# Patient Record
Sex: MALE | URBAN - METROPOLITAN AREA
[De-identification: names, ages, dates, MRNs, and addresses within clinical notes are randomized per-mention and may not be internally consistent; named-entity substitution may affect disease eponyms.]

---

## 2023-08-16 ENCOUNTER — OFFICE VISIT (OUTPATIENT)
Dept: URBAN - METROPOLITAN AREA CLINIC 33 | Facility: CLINIC | Age: 78
End: 2023-08-16

## 2023-09-13 ENCOUNTER — OFFICE VISIT (OUTPATIENT)
Dept: URBAN - METROPOLITAN AREA CLINIC 33 | Facility: CLINIC | Age: 78
End: 2023-09-13
Payer: MEDICARE

## 2023-09-13 VITALS
HEIGHT: 66 IN | SYSTOLIC BLOOD PRESSURE: 90 MMHG | DIASTOLIC BLOOD PRESSURE: 62 MMHG | BODY MASS INDEX: 26.52 KG/M2 | WEIGHT: 165 LBS

## 2023-09-13 DIAGNOSIS — R12 HEARTBURN: ICD-10-CM

## 2023-09-13 DIAGNOSIS — I95.89 OTHER SPECIFIED HYPOTENSION: ICD-10-CM

## 2023-09-13 DIAGNOSIS — R13.14 PHARYNGOESOPHAGEAL DYSPHAGIA: ICD-10-CM

## 2023-09-13 DIAGNOSIS — R19.4 CHANGE IN BOWEL HABITS: ICD-10-CM

## 2023-09-13 PROBLEM — 40739000: Status: ACTIVE | Noted: 2023-09-13

## 2023-09-13 PROCEDURE — 99204 OFFICE O/P NEW MOD 45 MIN: CPT | Performed by: PHYSICIAN ASSISTANT

## 2023-09-13 RX ORDER — LOVASTATIN 20 MG/1
TABLET ORAL
Qty: 90 TABLET | Status: ACTIVE | COMMUNITY

## 2023-09-13 RX ORDER — OMEPRAZOLE 20 MG/1
1 CAPSULE 30 MINUTES BEFORE MORNING MEAL CAPSULE, DELAYED RELEASE ORAL ONCE A DAY
Qty: 30 | Refills: 3 | OUTPATIENT
Start: 2023-09-13

## 2023-09-13 RX ORDER — BENZONATATE 100 MG/1
TAKE ONE CAPSULE BY MOUTH THREE TIMES A DAY AS NEEDED FOR COUGH CAPSULE ORAL
Qty: 21 UNSPECIFIED | Refills: 0 | Status: ACTIVE | COMMUNITY

## 2023-09-13 RX ORDER — LATANOPROST 50 UG/ML
SOLUTION/ DROPS OPHTHALMIC
Qty: 7.5 MILLILITER | Status: ACTIVE | COMMUNITY

## 2023-09-13 RX ORDER — DOXYCYCLINE HYCLATE 100 MG/1
TAKE ONE CAPSULE BY MOUTH TWICE A DAY UNTIL FINISHED CAPSULE ORAL
Qty: 20 UNSPECIFIED | Refills: 0 | Status: ACTIVE | COMMUNITY

## 2023-09-13 RX ORDER — FINASTERIDE 5 MG/1
TABLET, FILM COATED ORAL
Qty: 90 TABLET | Status: ACTIVE | COMMUNITY

## 2023-09-13 RX ORDER — TRAMADOL HYDROCHLORIDE 50 MG/1
TAKE ONE TABLET BY MOUTH TWICE A DAY AS NEEDED TABLET ORAL
Qty: 14 UNSPECIFIED | Refills: 0 | Status: ACTIVE | COMMUNITY

## 2023-09-13 RX ORDER — FUROSEMIDE 20 MG/1
TABLET ORAL
Qty: 90 TABLET | Status: ACTIVE | COMMUNITY

## 2023-09-13 RX ORDER — SODIUM, POTASSIUM,MAG SULFATES 17.5-3.13G
AS DIRECTED SOLUTION, RECONSTITUTED, ORAL ORAL
Qty: 1 | Refills: 0 | OUTPATIENT
Start: 2023-09-13 | End: 2023-09-15

## 2023-09-13 RX ORDER — PROPRANOLOL HYDROCHLORIDE 20 MG/1
TABLET ORAL
Qty: 60 TABLET | Status: ACTIVE | COMMUNITY

## 2023-09-13 RX ORDER — PRIMIDONE 250 MG/1
TABLET ORAL
Qty: 180 TABLET | Status: ACTIVE | COMMUNITY

## 2023-09-13 RX ORDER — ALBUTEROL SULFATE 90 UG/1
INHALE ONE PUFF BY MOUTH EVERY 4 HOURS AS NEEDED FOR WHEEZING AEROSOL, METERED RESPIRATORY (INHALATION)
Qty: 8.5 UNSPECIFIED | Refills: 0 | Status: ACTIVE | COMMUNITY

## 2023-09-13 RX ORDER — GABAPENTIN 300 MG/1
CAPSULE ORAL
Qty: 270 CAPSULE | Status: ACTIVE | COMMUNITY

## 2023-09-13 NOTE — HPI-DIARRHEA
Admits recent onset of diarrhea. Onset was about 4-5 weeks ago. Admits to occasional fecal incontinence.  No recent changes in meds. No weight loss.  Occasional lower abd pain prior to BM.  He has had some nocturnal diarrhea.  Last 3 days he has had one BM a day.  Prior to this, he was having up to 3-4 BMs a day with loose stools.  He states his stools are not loose now. Never had a colonoscopy.

## 2023-09-13 NOTE — HPI-DYSPHAGIA
78 year old male patient presents today for dysphagia when drinking liquids fast as well as with some solids. Onset was about 6 weeks ago.  He notes he will have issues with cereal.  Water will sometimes stay down, but if he drnks too fast, it will come back up.  He denies weight loss.   He will have some lower abd pain only prior to a BM.  He denies early satiety.  Denies any regurgitation or choking. Admits to heartburn. Denies having an EGD in the past.

## 2023-10-30 ENCOUNTER — TELEPHONE ENCOUNTER (OUTPATIENT)
Dept: URBAN - METROPOLITAN AREA CLINIC 33 | Facility: CLINIC | Age: 78
End: 2023-10-30

## 2023-11-27 ENCOUNTER — OFFICE VISIT (OUTPATIENT)
Dept: URBAN - METROPOLITAN AREA MEDICAL CENTER 27 | Facility: MEDICAL CENTER | Age: 78
End: 2023-11-27
Payer: MEDICARE

## 2023-11-27 ENCOUNTER — TELEPHONE ENCOUNTER (OUTPATIENT)
Dept: URBAN - METROPOLITAN AREA CLINIC 35 | Facility: CLINIC | Age: 78
End: 2023-11-27

## 2023-11-27 DIAGNOSIS — Z12.11 COLON CANCER SCREENING: ICD-10-CM

## 2023-11-27 PROCEDURE — 992 NON-BILLABLE: Performed by: INTERNAL MEDICINE

## 2023-11-28 ENCOUNTER — CLAIMS CREATED FROM THE CLAIM WINDOW (OUTPATIENT)
Dept: URBAN - METROPOLITAN AREA MEDICAL CENTER 27 | Facility: MEDICAL CENTER | Age: 78
End: 2023-11-28
Payer: MEDICARE

## 2023-11-28 ENCOUNTER — CLAIMS CREATED FROM THE CLAIM WINDOW (OUTPATIENT)
Dept: URBAN - METROPOLITAN AREA MEDICAL CENTER 27 | Facility: MEDICAL CENTER | Age: 78
End: 2023-11-28

## 2023-11-28 DIAGNOSIS — R13.10 ABNORMAL DEGLUTITION: ICD-10-CM

## 2023-11-28 DIAGNOSIS — R19.7 ACUTE DIARRHEA: ICD-10-CM

## 2023-11-28 DIAGNOSIS — R10.11 ABDOMINAL BURNING SENSATION IN RIGHT UPPER QUADRANT: ICD-10-CM

## 2023-11-28 DIAGNOSIS — R11.2 ACUTE NAUSEA WITH NONBILIOUS VOMITING: ICD-10-CM

## 2023-11-28 PROCEDURE — 99254 IP/OBS CNSLTJ NEW/EST MOD 60: CPT | Performed by: INTERNAL MEDICINE

## 2023-11-28 PROCEDURE — 99222 1ST HOSP IP/OBS MODERATE 55: CPT | Performed by: INTERNAL MEDICINE

## 2023-11-28 PROCEDURE — G8427 DOCREV CUR MEDS BY ELIG CLIN: HCPCS | Performed by: INTERNAL MEDICINE

## 2023-11-29 ENCOUNTER — CLAIMS CREATED FROM THE CLAIM WINDOW (OUTPATIENT)
Dept: URBAN - METROPOLITAN AREA MEDICAL CENTER 27 | Facility: MEDICAL CENTER | Age: 78
End: 2023-11-29

## 2023-11-29 ENCOUNTER — CLAIMS CREATED FROM THE CLAIM WINDOW (OUTPATIENT)
Dept: URBAN - METROPOLITAN AREA MEDICAL CENTER 27 | Facility: MEDICAL CENTER | Age: 78
End: 2023-11-29
Payer: MEDICARE

## 2023-11-29 DIAGNOSIS — K29.60 ADENOPAPILLOMATOSIS GASTRICA: ICD-10-CM

## 2023-11-29 DIAGNOSIS — R19.7 ACUTE DIARRHEA: ICD-10-CM

## 2023-11-29 DIAGNOSIS — R13.10 ABNORMAL DEGLUTITION: ICD-10-CM

## 2023-11-29 PROCEDURE — 43235 EGD DIAGNOSTIC BRUSH WASH: CPT | Performed by: INTERNAL MEDICINE

## 2023-11-29 PROCEDURE — 45380 COLONOSCOPY AND BIOPSY: CPT | Performed by: INTERNAL MEDICINE

## 2023-11-29 PROCEDURE — 43239 EGD BIOPSY SINGLE/MULTIPLE: CPT | Performed by: INTERNAL MEDICINE

## 2023-11-30 ENCOUNTER — CLAIMS CREATED FROM THE CLAIM WINDOW (OUTPATIENT)
Dept: URBAN - METROPOLITAN AREA MEDICAL CENTER 27 | Facility: MEDICAL CENTER | Age: 78
End: 2023-11-30
Payer: MEDICARE

## 2023-11-30 DIAGNOSIS — R11.2 ACUTE NAUSEA WITH NONBILIOUS VOMITING: ICD-10-CM

## 2023-11-30 DIAGNOSIS — R19.7 ACUTE DIARRHEA: ICD-10-CM

## 2023-11-30 DIAGNOSIS — R13.10 ABNORMAL DEGLUTITION: ICD-10-CM

## 2023-11-30 DIAGNOSIS — R10.11 ABDOMINAL BURNING SENSATION IN RIGHT UPPER QUADRANT: ICD-10-CM

## 2023-11-30 PROCEDURE — 99232 SBSQ HOSP IP/OBS MODERATE 35: CPT | Performed by: PHYSICIAN ASSISTANT

## 2023-12-01 ENCOUNTER — TELEPHONE ENCOUNTER (OUTPATIENT)
Dept: URBAN - METROPOLITAN AREA CLINIC 33 | Facility: CLINIC | Age: 78
End: 2023-12-01

## 2023-12-20 ENCOUNTER — CLAIMS CREATED FROM THE CLAIM WINDOW (OUTPATIENT)
Dept: URBAN - METROPOLITAN AREA CLINIC 33 | Facility: CLINIC | Age: 78
End: 2023-12-20
Payer: MEDICARE

## 2023-12-20 ENCOUNTER — TELEPHONE ENCOUNTER (OUTPATIENT)
Dept: URBAN - METROPOLITAN AREA CLINIC 33 | Facility: CLINIC | Age: 78
End: 2023-12-20

## 2023-12-20 VITALS
HEIGHT: 66 IN | SYSTOLIC BLOOD PRESSURE: 108 MMHG | BODY MASS INDEX: 26.52 KG/M2 | DIASTOLIC BLOOD PRESSURE: 64 MMHG | WEIGHT: 165 LBS

## 2023-12-20 DIAGNOSIS — K57.30 ACQUIRED DIVERTICULOSIS OF COLON: ICD-10-CM

## 2023-12-20 DIAGNOSIS — R13.14 PHARYNGOESOPHAGEAL DYSPHAGIA: ICD-10-CM

## 2023-12-20 DIAGNOSIS — R19.7 DIARRHEA, UNSPECIFIED TYPE: ICD-10-CM

## 2023-12-20 DIAGNOSIS — I95.89 OTHER SPECIFIED HYPOTENSION: ICD-10-CM

## 2023-12-20 DIAGNOSIS — K31.89 REACTIVE GASTROPATHY: ICD-10-CM

## 2023-12-20 DIAGNOSIS — K44.9 HIATAL HERNIA: ICD-10-CM

## 2023-12-20 DIAGNOSIS — K64.8 OTHER HEMORRHOIDS: ICD-10-CM

## 2023-12-20 DIAGNOSIS — K86.81 EXOCRINE PANCREATIC INSUFFICIENCY: ICD-10-CM

## 2023-12-20 DIAGNOSIS — K57.90 DIVERTICULOSIS: ICD-10-CM

## 2023-12-20 DIAGNOSIS — R12 HEARTBURN: ICD-10-CM

## 2023-12-20 DIAGNOSIS — K29.30 CHRONIC SUPERFICIAL GASTRITIS WITHOUT BLEEDING: ICD-10-CM

## 2023-12-20 PROBLEM — 397881000: Status: ACTIVE | Noted: 2023-12-20

## 2023-12-20 PROBLEM — 196735001: Status: ACTIVE | Noted: 2023-12-20

## 2023-12-20 PROCEDURE — 99214 OFFICE O/P EST MOD 30 MIN: CPT | Performed by: PHYSICIAN ASSISTANT

## 2023-12-20 RX ORDER — PROPRANOLOL HYDROCHLORIDE 20 MG/1
TABLET ORAL
Qty: 60 TABLET | Status: ACTIVE | COMMUNITY

## 2023-12-20 RX ORDER — GABAPENTIN 300 MG/1
CAPSULE ORAL
Qty: 270 CAPSULE | Status: ACTIVE | COMMUNITY

## 2023-12-20 RX ORDER — FINASTERIDE 5 MG/1
TABLET, FILM COATED ORAL
Qty: 90 TABLET | Status: ACTIVE | COMMUNITY

## 2023-12-20 RX ORDER — DOXYCYCLINE HYCLATE 100 MG/1
TAKE ONE CAPSULE BY MOUTH TWICE A DAY UNTIL FINISHED CAPSULE ORAL
Qty: 20 UNSPECIFIED | Refills: 0 | Status: ACTIVE | COMMUNITY

## 2023-12-20 RX ORDER — LATANOPROST 50 UG/ML
SOLUTION/ DROPS OPHTHALMIC
Qty: 7.5 MILLILITER | Status: ACTIVE | COMMUNITY

## 2023-12-20 RX ORDER — PANCRELIPASE LIPASE, PANCRELIPASE AMYLASE, AND PANCRELIPASE PROTEASE 149900; 37000; 97300 [USP'U]/1; [USP'U]/1; [USP'U]/1
2 CAPSULES WITH MEALS AND 1 WITH SNACK CAPSULE, DELAYED RELEASE ORAL
Qty: 300 | Refills: 3 | OUTPATIENT
Start: 2023-12-21 | End: 2024-04-19

## 2023-12-20 RX ORDER — TRAMADOL HYDROCHLORIDE 50 MG/1
TAKE ONE TABLET BY MOUTH TWICE A DAY AS NEEDED TABLET ORAL
Qty: 14 UNSPECIFIED | Refills: 0 | Status: ACTIVE | COMMUNITY

## 2023-12-20 RX ORDER — OMEPRAZOLE 20 MG/1
1 CAPSULE 30 MINUTES BEFORE MORNING MEAL CAPSULE, DELAYED RELEASE ORAL ONCE A DAY
Qty: 30 | Refills: 3 | OUTPATIENT

## 2023-12-20 RX ORDER — ALBUTEROL SULFATE 90 UG/1
INHALE ONE PUFF BY MOUTH EVERY 4 HOURS AS NEEDED FOR WHEEZING AEROSOL, METERED RESPIRATORY (INHALATION)
Qty: 8.5 UNSPECIFIED | Refills: 0 | Status: ACTIVE | COMMUNITY

## 2023-12-20 RX ORDER — PRIMIDONE 250 MG/1
TABLET ORAL
Qty: 180 TABLET | Status: ACTIVE | COMMUNITY

## 2023-12-20 RX ORDER — PANCRELIPASE LIPASE, PANCRELIPASE PROTEASE, PANCRELIPASE AMYLASE 40000; 126000; 168000 [USP'U]/1; [USP'U]/1; [USP'U]/1
AS DIRECTED CAPSULE, DELAYED RELEASE ORAL
Qty: 540 | Refills: 1 | OUTPATIENT
Start: 2023-12-20 | End: 2024-02-18

## 2023-12-20 RX ORDER — LOVASTATIN 20 MG/1
TABLET ORAL
Qty: 90 TABLET | Status: ACTIVE | COMMUNITY

## 2023-12-20 RX ORDER — OMEPRAZOLE 20 MG/1
1 CAPSULE 30 MINUTES BEFORE MORNING MEAL CAPSULE, DELAYED RELEASE ORAL ONCE A DAY
Qty: 30 | Refills: 3 | Status: ON HOLD | COMMUNITY
Start: 2023-09-13

## 2023-12-20 RX ORDER — BENZONATATE 100 MG/1
TAKE ONE CAPSULE BY MOUTH THREE TIMES A DAY AS NEEDED FOR COUGH CAPSULE ORAL
Qty: 21 UNSPECIFIED | Refills: 0 | Status: ACTIVE | COMMUNITY

## 2023-12-20 RX ORDER — FUROSEMIDE 20 MG/1
TABLET ORAL
Qty: 90 TABLET | Status: ACTIVE | COMMUNITY

## 2023-12-20 NOTE — HPI-DYSPHAGIA
Patient presents today for a follow up from his procedure and of dysphagia. He admits continued episodes of dysphagia since his last viist when he drinks something fast. He denies taking Omeprazole.   EGD report shows: -Small hiatal hernia. No narrowing. -Normal stomach. 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 - Normal examined duodenum.  Last visit (9/13/2023): 78 year old male patient presents today for dysphagia when drinking liquids fast as well as with some solids. Onset was about 6 weeks ago.  He notes he will have issues with cereal.  Water will sometimes stay down, but if he drnks too fast, it will come back up.  He denies weight loss.   He will have some lower abd pain only prior to a BM.  He denies early satiety.  Denies any regurgitation or choking. Admits to heartburn. Denies having an EGD in the past.

## 2023-12-20 NOTE — HPI-DIARRHEA
Currently admits 1-2 bowel movements per day. Stools are semi-formed. He denies any blood or mucus in stools. Stool Dx report showed slight to moderate pancreatic insufficiency. He had had some weight loss, 70 lbs, but has gained some of it back.  Colonoscopy report shows: -Hemorrhoids found on perianal exam. -Diverticulosis in the sigmoid colon and in the descending colon. -Otherwise normal mucosa in the entire examined colon. Colonic mucosa within normal limits. No evidence of microscopic colitis. -Internal hemorrhoids.   Last visit (9/13/2023): Admits recent onset of diarrhea. Onset was about 4-5 weeks ago. Admits to occasional fecal incontinence.  No recent changes in meds. No weight loss.  Occasional lower abd pain prior to BM.  He has had some nocturnal diarrhea.  Last 3 days he has had one BM a day.  Prior to this, he was having up to 3-4 BMs a day with loose stools.  He states his stools are not loose now. Never had a colonoscopy. negative - no cough

## 2024-01-24 ENCOUNTER — OFFICE VISIT (OUTPATIENT)
Dept: URBAN - METROPOLITAN AREA CLINIC 33 | Facility: CLINIC | Age: 79
End: 2024-01-24

## 2024-01-24 NOTE — HPI-DIARRHEA
Patient currently admits -- bowel movements per --. Stools are --. He denies any blood or mucus in stools. He admits/denies any weight loss since his las visit. He admits/denies starting Pancreaze as Zenpep was not covered.   Last visit (12/20/2023): Currently admits 1-2 bowel movements per day. Stools are semi-formed. He denies any blood or mucus in stools. Stool Dx report showed slight to moderate pancreatic insufficiency. He had had some weight loss, 70 lbs, but has gained some of it back.  Colonoscopy report shows: -Hemorrhoids found on perianal exam. -Diverticulosis in the sigmoid colon and in the descending colon. -Otherwise normal mucosa in the entire examined colon. Colonic mucosa within normal limits. No evidence of microscopic colitis. -Internal hemorrhoids.

## 2024-01-24 NOTE — HPI-DYSPHAGIA
78 year old male patient presents today for a follow up. He admits/denies improvment in dysphagia since his last visit. He admits/denies starting Omeprazole.  Last visit (12/20/2023): Patient presents today for a follow up from his procedure and of dysphagia. He admits continued episodes of dysphagia since his last viist when he drinks something fast. He denies taking Omeprazole.   EGD report shows: -Small hiatal hernia. No narrowing. -Normal stomach. 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 - Normal examined duodenum.

## 2024-02-07 ENCOUNTER — OV EP (OUTPATIENT)
Dept: URBAN - METROPOLITAN AREA CLINIC 33 | Facility: CLINIC | Age: 79
End: 2024-02-07
Payer: MEDICARE

## 2024-02-07 VITALS
SYSTOLIC BLOOD PRESSURE: 106 MMHG | WEIGHT: 165 LBS | DIASTOLIC BLOOD PRESSURE: 64 MMHG | BODY MASS INDEX: 26.52 KG/M2 | HEIGHT: 66 IN

## 2024-02-07 DIAGNOSIS — R12 HEARTBURN: ICD-10-CM

## 2024-02-07 DIAGNOSIS — K29.30 CHRONIC SUPERFICIAL GASTRITIS WITHOUT BLEEDING: ICD-10-CM

## 2024-02-07 DIAGNOSIS — K57.90 DIVERTICULOSIS: ICD-10-CM

## 2024-02-07 DIAGNOSIS — K31.89 REACTIVE GASTROPATHY: ICD-10-CM

## 2024-02-07 DIAGNOSIS — K44.9 HIATAL HERNIA: ICD-10-CM

## 2024-02-07 DIAGNOSIS — K86.81 EXOCRINE PANCREATIC INSUFFICIENCY: ICD-10-CM

## 2024-02-07 DIAGNOSIS — R13.14 PHARYNGOESOPHAGEAL DYSPHAGIA: ICD-10-CM

## 2024-02-07 PROCEDURE — 99213 OFFICE O/P EST LOW 20 MIN: CPT | Performed by: PHYSICIAN ASSISTANT

## 2024-02-07 RX ORDER — OMEPRAZOLE 20 MG/1
1 CAPSULE 30 MINUTES BEFORE MORNING MEAL CAPSULE, DELAYED RELEASE ORAL ONCE A DAY
Qty: 30 | Refills: 3 | OUTPATIENT

## 2024-02-07 RX ORDER — PROPRANOLOL HYDROCHLORIDE 20 MG/1
TABLET ORAL
Qty: 60 TABLET | Status: ACTIVE | COMMUNITY

## 2024-02-07 RX ORDER — FUROSEMIDE 20 MG/1
TABLET ORAL
Qty: 90 TABLET | Status: ACTIVE | COMMUNITY

## 2024-02-07 RX ORDER — FINASTERIDE 5 MG/1
TABLET, FILM COATED ORAL
Qty: 90 TABLET | Status: ACTIVE | COMMUNITY

## 2024-02-07 RX ORDER — DOXYCYCLINE HYCLATE 100 MG/1
TAKE ONE CAPSULE BY MOUTH TWICE A DAY UNTIL FINISHED CAPSULE ORAL
Qty: 20 UNSPECIFIED | Refills: 0 | Status: ACTIVE | COMMUNITY

## 2024-02-07 RX ORDER — GABAPENTIN 300 MG/1
CAPSULE ORAL
Qty: 270 CAPSULE | Status: ACTIVE | COMMUNITY

## 2024-02-07 RX ORDER — PANCRELIPASE LIPASE, PANCRELIPASE AMYLASE, AND PANCRELIPASE PROTEASE 149900; 37000; 97300 [USP'U]/1; [USP'U]/1; [USP'U]/1
2 CAPSULES WITH MEALS AND 1 WITH SNACK CAPSULE, DELAYED RELEASE ORAL
Qty: 300 | Refills: 3 | Status: ON HOLD | COMMUNITY
Start: 2023-12-21 | End: 2024-04-19

## 2024-02-07 RX ORDER — OMEPRAZOLE 20 MG/1
1 CAPSULE 30 MINUTES BEFORE MORNING MEAL CAPSULE, DELAYED RELEASE ORAL ONCE A DAY
Qty: 30 | Refills: 3 | Status: ON HOLD | COMMUNITY

## 2024-02-07 RX ORDER — LATANOPROST 50 UG/ML
SOLUTION/ DROPS OPHTHALMIC
Qty: 7.5 MILLILITER | Status: ACTIVE | COMMUNITY

## 2024-02-07 RX ORDER — BENZONATATE 100 MG/1
TAKE ONE CAPSULE BY MOUTH THREE TIMES A DAY AS NEEDED FOR COUGH CAPSULE ORAL
Qty: 21 UNSPECIFIED | Refills: 0 | Status: ACTIVE | COMMUNITY

## 2024-02-07 RX ORDER — LOVASTATIN 20 MG/1
TABLET ORAL
Qty: 90 TABLET | Status: ACTIVE | COMMUNITY

## 2024-02-07 RX ORDER — PANCRELIPASE LIPASE, PANCRELIPASE PROTEASE, PANCRELIPASE AMYLASE 40000; 126000; 168000 [USP'U]/1; [USP'U]/1; [USP'U]/1
AS DIRECTED CAPSULE, DELAYED RELEASE ORAL
Qty: 540 | Refills: 1 | Status: ON HOLD | COMMUNITY
Start: 2023-12-20 | End: 2024-02-18

## 2024-02-07 RX ORDER — TRAMADOL HYDROCHLORIDE 50 MG/1
TAKE ONE TABLET BY MOUTH TWICE A DAY AS NEEDED TABLET ORAL
Qty: 14 UNSPECIFIED | Refills: 0 | Status: ACTIVE | COMMUNITY

## 2024-02-07 RX ORDER — ALBUTEROL SULFATE 90 UG/1
INHALE ONE PUFF BY MOUTH EVERY 4 HOURS AS NEEDED FOR WHEEZING AEROSOL, METERED RESPIRATORY (INHALATION)
Qty: 8.5 UNSPECIFIED | Refills: 0 | Status: ACTIVE | COMMUNITY

## 2024-02-07 RX ORDER — PANCRELIPASE LIPASE, PANCRELIPASE AMYLASE, AND PANCRELIPASE PROTEASE 149900; 37000; 97300 [USP'U]/1; [USP'U]/1; [USP'U]/1
2 CAPSULES WITH MEALS AND 1 WITH SNACK CAPSULE, DELAYED RELEASE ORAL
Qty: 300 | Refills: 3 | OUTPATIENT

## 2024-02-07 RX ORDER — PRIMIDONE 250 MG/1
TABLET ORAL
Qty: 180 TABLET | Status: ACTIVE | COMMUNITY

## 2024-02-07 NOTE — HPI-DYSPHAGIA
78 year old male patient presents today for a follow up. He admits continued episodes of dysphagia since his last visit.  He states if he drinks too fast, the liquids will come back up.  He denies issues with liquids other than chicken tenders. He admits starting Omeprazole daily.  Last visit (12/20/2023): Patient presents today for a follow up from his procedure and of dysphagia. He admits continued episodes of dysphagia since his last viist when he drinks something fast. He denies taking Omeprazole.   EGD report shows: -Small hiatal hernia. No narrowing. -Normal stomach. 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 - Normal examined duodenum.

## 2024-02-07 NOTE — HPI-DIARRHEA
Patient currently admits 1 bowel movement per day for the most part and will skip a day here and there. Stools are loose or semi-formed but only sometimes, and has appeared to have improved.. He denies any blood or mucus in stools. He denies any weight loss since his las visit. He denies starting Pancreaze and Zenpep was not covered.   Last visit (12/20/2023): Currently admits 1-2 bowel movements per day. Stools are semi-formed. He denies any blood or mucus in stools. Stool Dx report showed slight to moderate pancreatic insufficiency. He had had some weight loss, 70 lbs, but has gained some of it back.  Colonoscopy report shows: -Hemorrhoids found on perianal exam. -Diverticulosis in the sigmoid colon and in the descending colon. -Otherwise normal mucosa in the entire examined colon. Colonic mucosa within normal limits. No evidence of microscopic colitis. -Internal hemorrhoids.

## 2024-08-07 ENCOUNTER — OFFICE VISIT (OUTPATIENT)
Dept: URBAN - METROPOLITAN AREA CLINIC 33 | Facility: CLINIC | Age: 79
End: 2024-08-07

## 2024-08-21 ENCOUNTER — TELEPHONE ENCOUNTER (OUTPATIENT)
Dept: URBAN - METROPOLITAN AREA CLINIC 35 | Facility: CLINIC | Age: 79
End: 2024-08-21

## 2024-08-21 ENCOUNTER — OFFICE VISIT (OUTPATIENT)
Dept: URBAN - METROPOLITAN AREA CLINIC 33 | Facility: CLINIC | Age: 79
End: 2024-08-21
Payer: MEDICARE

## 2024-08-21 ENCOUNTER — DASHBOARD ENCOUNTERS (OUTPATIENT)
Age: 79
End: 2024-08-21

## 2024-08-21 VITALS
HEIGHT: 66 IN | WEIGHT: 163 LBS | DIASTOLIC BLOOD PRESSURE: 64 MMHG | BODY MASS INDEX: 26.2 KG/M2 | SYSTOLIC BLOOD PRESSURE: 110 MMHG

## 2024-08-21 DIAGNOSIS — R12 HEARTBURN: ICD-10-CM

## 2024-08-21 DIAGNOSIS — K86.81 EXOCRINE PANCREATIC INSUFFICIENCY: ICD-10-CM

## 2024-08-21 PROCEDURE — 99213 OFFICE O/P EST LOW 20 MIN: CPT | Performed by: PHYSICIAN ASSISTANT

## 2024-08-21 RX ORDER — TRAMADOL HYDROCHLORIDE 50 MG/1
TAKE ONE TABLET BY MOUTH TWICE A DAY AS NEEDED TABLET ORAL
Qty: 14 UNSPECIFIED | Refills: 0 | Status: ON HOLD | COMMUNITY

## 2024-08-21 RX ORDER — OMEPRAZOLE 20 MG/1
1 CAPSULE 30 MINUTES BEFORE MORNING MEAL CAPSULE, DELAYED RELEASE ORAL ONCE A DAY
Qty: 30 | Refills: 3 | OUTPATIENT

## 2024-08-21 RX ORDER — PROPRANOLOL HYDROCHLORIDE 20 MG/1
TABLET ORAL
Qty: 60 TABLET | Status: ACTIVE | COMMUNITY

## 2024-08-21 RX ORDER — BENZONATATE 100 MG/1
TAKE ONE CAPSULE BY MOUTH THREE TIMES A DAY AS NEEDED FOR COUGH CAPSULE ORAL
Qty: 21 UNSPECIFIED | Refills: 0 | Status: ON HOLD | COMMUNITY

## 2024-08-21 RX ORDER — PRIMIDONE 250 MG/1
TABLET ORAL
Qty: 180 TABLET | Status: ACTIVE | COMMUNITY

## 2024-08-21 RX ORDER — PANCRELIPASE LIPASE, PANCRELIPASE AMYLASE, AND PANCRELIPASE PROTEASE 149900; 37000; 97300 [USP'U]/1; [USP'U]/1; [USP'U]/1
2 CAPSULES WITH MEALS AND 1 WITH SNACK CAPSULE, DELAYED RELEASE ORAL
Qty: 300 | Refills: 3 | OUTPATIENT

## 2024-08-21 RX ORDER — FINASTERIDE 5 MG/1
TABLET, FILM COATED ORAL
Qty: 90 TABLET | Status: ACTIVE | COMMUNITY

## 2024-08-21 RX ORDER — LOVASTATIN 20 MG/1
TABLET ORAL
Qty: 90 TABLET | Status: ACTIVE | COMMUNITY

## 2024-08-21 RX ORDER — OMEPRAZOLE 20 MG/1
1 CAPSULE 30 MINUTES BEFORE MORNING MEAL CAPSULE, DELAYED RELEASE ORAL ONCE A DAY
Qty: 30 | Refills: 3 | Status: ON HOLD | COMMUNITY

## 2024-08-21 RX ORDER — FUROSEMIDE 20 MG/1
TABLET ORAL
Qty: 90 TABLET | Status: ACTIVE | COMMUNITY

## 2024-08-21 RX ORDER — GABAPENTIN 300 MG/1
CAPSULE ORAL
Qty: 270 CAPSULE | Status: ACTIVE | COMMUNITY

## 2024-08-21 RX ORDER — LATANOPROST 50 UG/ML
SOLUTION/ DROPS OPHTHALMIC
Qty: 7.5 MILLILITER | Status: ACTIVE | COMMUNITY

## 2024-08-21 RX ORDER — ALBUTEROL SULFATE 90 UG/1
INHALE ONE PUFF BY MOUTH EVERY 4 HOURS AS NEEDED FOR WHEEZING AEROSOL, METERED RESPIRATORY (INHALATION)
Qty: 8.5 UNSPECIFIED | Refills: 0 | Status: ON HOLD | COMMUNITY

## 2024-08-21 RX ORDER — DOXYCYCLINE HYCLATE 100 MG/1
TAKE ONE CAPSULE BY MOUTH TWICE A DAY UNTIL FINISHED CAPSULE ORAL
Qty: 20 UNSPECIFIED | Refills: 0 | Status: ON HOLD | COMMUNITY

## 2024-08-21 RX ORDER — PANCRELIPASE LIPASE, PANCRELIPASE AMYLASE, AND PANCRELIPASE PROTEASE 149900; 37000; 97300 [USP'U]/1; [USP'U]/1; [USP'U]/1
2 CAPSULES WITH MEALS AND 1 WITH SNACK CAPSULE, DELAYED RELEASE ORAL
Qty: 300 | Refills: 3 | Status: ON HOLD | COMMUNITY

## 2024-08-21 NOTE — HPI-DIARRHEA
Pt never started Pancreaze. He admits chewing slowly, drink slowly. Take small bites. He currently admits normal bowel habits while taking OTC probiotics. He states he is doing well without the Pancreaze.  Last visit: Patient currently admits 1 bowel movement per day for the most part and will skip a day here and there. Stools are loose or semi-formed but only sometimes, and has appeared to have improved.. He denies any blood or mucus in stools. He denies any weight loss since his las visit. He denies starting Pancreaze and Zenpep was not covered.   Last visit (12/20/2023): Currently admits 1-2 bowel movements per day. Stools are semi-formed. He denies any blood or mucus in stools. Stool Dx report showed slight to moderate pancreatic insufficiency. He had had some weight loss, 70 lbs, but has gained some of it back.  Colonoscopy report shows: -Hemorrhoids found on perianal exam. -Diverticulosis in the sigmoid colon and in the descending colon. -Otherwise normal mucosa in the entire examined colon. Colonic mucosa within normal limits. No evidence of microscopic colitis. -Internal hemorrhoids.

## 2024-08-21 NOTE — HPI-DYSPHAGIA
Patient presents today for a follow up. He denies continued episodes of dysphagia since his last visit. He denies taking Omeprazole. He admits continued episodes of when he drinks water too fast it comes back up.   Last visit: 78 year old male patient presents today for a follow up. He admits continued episodes of dysphagia since his last visit.  He states if he drinks too fast, the liquids will come back up.  He denies issues with liquids other than chicken tenders. He admits starting Omeprazole daily.  Last visit (12/20/2023): Patient presents today for a follow up from his procedure and of dysphagia. He admits continued episodes of dysphagia since his last viist when he drinks something fast. He denies taking Omeprazole.   EGD report shows: -Small hiatal hernia. No narrowing. -Normal stomach. 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 - Normal examined duodenum.

## 2024-12-11 ENCOUNTER — OFFICE VISIT (OUTPATIENT)
Dept: URBAN - METROPOLITAN AREA CLINIC 33 | Facility: CLINIC | Age: 79
End: 2024-12-11
Payer: MEDICARE

## 2024-12-11 VITALS
BODY MASS INDEX: 24.27 KG/M2 | HEIGHT: 66 IN | HEART RATE: 56 BPM | SYSTOLIC BLOOD PRESSURE: 120 MMHG | WEIGHT: 151 LBS | OXYGEN SATURATION: 99 % | DIASTOLIC BLOOD PRESSURE: 70 MMHG

## 2024-12-11 DIAGNOSIS — K31.89 REACTIVE GASTROPATHY: ICD-10-CM

## 2024-12-11 DIAGNOSIS — R13.14 PHARYNGOESOPHAGEAL DYSPHAGIA: ICD-10-CM

## 2024-12-11 DIAGNOSIS — K86.81 EXOCRINE PANCREATIC INSUFFICIENCY: ICD-10-CM

## 2024-12-11 DIAGNOSIS — K57.30 DIVERTICULOSIS OF SIGMOID COLON: ICD-10-CM

## 2024-12-11 DIAGNOSIS — R12 HEARTBURN: ICD-10-CM

## 2024-12-11 DIAGNOSIS — K29.30 CHRONIC SUPERFICIAL GASTRITIS WITHOUT BLEEDING: ICD-10-CM

## 2024-12-11 DIAGNOSIS — K44.9 HIATAL HERNIA: ICD-10-CM

## 2024-12-11 PROCEDURE — 99213 OFFICE O/P EST LOW 20 MIN: CPT | Performed by: PHYSICIAN ASSISTANT

## 2024-12-11 RX ORDER — PROPRANOLOL HYDROCHLORIDE 20 MG/1
TABLET ORAL
Qty: 60 TABLET | Status: ACTIVE | COMMUNITY

## 2024-12-11 RX ORDER — ALBUTEROL SULFATE 90 UG/1
INHALE ONE PUFF BY MOUTH EVERY 4 HOURS AS NEEDED FOR WHEEZING AEROSOL, METERED RESPIRATORY (INHALATION)
Qty: 8.5 UNSPECIFIED | Refills: 0 | Status: ON HOLD | COMMUNITY

## 2024-12-11 RX ORDER — OMEPRAZOLE 20 MG/1
1 CAPSULE 30 MINUTES BEFORE MORNING MEAL CAPSULE, DELAYED RELEASE ORAL ONCE A DAY
Qty: 30 | Refills: 3 | Status: ACTIVE | COMMUNITY

## 2024-12-11 RX ORDER — FUROSEMIDE 20 MG/1
TABLET ORAL
Qty: 90 TABLET | Status: ACTIVE | COMMUNITY

## 2024-12-11 RX ORDER — PRIMIDONE 250 MG/1
TABLET ORAL
Qty: 180 TABLET | Status: ACTIVE | COMMUNITY

## 2024-12-11 RX ORDER — LATANOPROST 50 UG/ML
SOLUTION/ DROPS OPHTHALMIC
Qty: 7.5 MILLILITER | Status: ACTIVE | COMMUNITY

## 2024-12-11 RX ORDER — TRAMADOL HYDROCHLORIDE 50 MG/1
TAKE ONE TABLET BY MOUTH TWICE A DAY AS NEEDED TABLET ORAL
Qty: 14 UNSPECIFIED | Refills: 0 | Status: ON HOLD | COMMUNITY

## 2024-12-11 RX ORDER — OMEPRAZOLE 20 MG/1
1 CAPSULE 30 MINUTES BEFORE MORNING MEAL CAPSULE, DELAYED RELEASE ORAL ONCE A DAY
Qty: 30 | Refills: 3 | OUTPATIENT

## 2024-12-11 RX ORDER — FINASTERIDE 5 MG/1
TABLET, FILM COATED ORAL
Qty: 90 TABLET | Status: ACTIVE | COMMUNITY

## 2024-12-11 RX ORDER — GABAPENTIN 300 MG/1
CAPSULE ORAL
Qty: 270 CAPSULE | Status: ACTIVE | COMMUNITY

## 2024-12-11 RX ORDER — LOVASTATIN 20 MG/1
TABLET ORAL
Qty: 90 TABLET | Status: ACTIVE | COMMUNITY

## 2024-12-11 RX ORDER — BENZONATATE 100 MG/1
TAKE ONE CAPSULE BY MOUTH THREE TIMES A DAY AS NEEDED FOR COUGH CAPSULE ORAL
Qty: 21 UNSPECIFIED | Refills: 0 | Status: ON HOLD | COMMUNITY

## 2024-12-11 RX ORDER — PANCRELIPASE LIPASE, PANCRELIPASE AMYLASE, AND PANCRELIPASE PROTEASE 149900; 37000; 97300 [USP'U]/1; [USP'U]/1; [USP'U]/1
2 CAPSULES WITH MEALS AND 1 WITH SNACK CAPSULE, DELAYED RELEASE ORAL
Qty: 300 | Refills: 3 | Status: ACTIVE | COMMUNITY

## 2024-12-11 RX ORDER — DOXYCYCLINE HYCLATE 100 MG/1
TAKE ONE CAPSULE BY MOUTH TWICE A DAY UNTIL FINISHED CAPSULE ORAL
Qty: 20 UNSPECIFIED | Refills: 0 | Status: ON HOLD | COMMUNITY

## 2024-12-11 NOTE — HPI-DYSPHAGIA
Patient presents today for a follow up. He denies any episodes of dysphagia. He admits taking Omeprazole. He currently admits no complaints at this time.  Last visit: Patient presents today for a follow up. He denies continued episodes of dysphagia since his last visit. He denies taking Omeprazole. He admits continued episodes of when he drinks water too fast it comes back up.   Last visit: 78 year old male patient presents today for a follow up. He admits continued episodes of dysphagia since his last visit.  He states if he drinks too fast, the liquids will come back up.  He denies issues with liquids other than chicken tenders. He admits starting Omeprazole daily.  Last visit (12/20/2023): Patient presents today for a follow up from his procedure and of dysphagia. He admits continued episodes of dysphagia since his last viist when he drinks something fast. He denies taking Omeprazole.   EGD report shows: -Small hiatal hernia. No narrowing. -Normal stomach. 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 - Normal examined duodenum.

## 2024-12-11 NOTE — HPI-DIARRHEA
He denies starting Pancreaze. Admits improvement as long as he stays away from greasty foods. He denies diarrhea or abd pain, and controlling symptoms with his diet.  Last visit: Pt never started Pancreaze. He admits chewing slowly, drink slowly. Take small bites. He currently admits normal bowel habits while taking OTC probiotics. He states he is doing well without the Pancreaze.  Last visit: Patient currently admits 1 bowel movement per day for the most part and will skip a day here and there. Stools are loose or semi-formed but only sometimes, and has appeared to have improved.. He denies any blood or mucus in stools. He denies any weight loss since his las visit. He denies starting Pancreaze and Zenpep was not covered.   Last visit (12/20/2023): Currently admits 1-2 bowel movements per day. Stools are semi-formed. He denies any blood or mucus in stools. Stool Dx report showed slight to moderate pancreatic insufficiency. He had had some weight loss, 70 lbs, but has gained some of it back.  Colonoscopy report shows: -Hemorrhoids found on perianal exam. -Diverticulosis in the sigmoid colon and in the descending colon. -Otherwise normal mucosa in the entire examined colon. Colonic mucosa within normal limits. No evidence of microscopic colitis. -Internal hemorrhoids.

## 2025-07-04 ENCOUNTER — CLAIMS CREATED FROM THE CLAIM WINDOW (OUTPATIENT)
Dept: URBAN - METROPOLITAN AREA MEDICAL CENTER 27 | Facility: MEDICAL CENTER | Age: 80
End: 2025-07-04
Payer: MEDICARE

## 2025-07-04 DIAGNOSIS — K57.31 DIVERTICULAR HEMORRHAGE: ICD-10-CM

## 2025-07-04 DIAGNOSIS — D62 ABLA (ACUTE BLOOD LOSS ANEMIA): ICD-10-CM

## 2025-07-04 PROCEDURE — 99255 IP/OBS CONSLTJ NEW/EST HI 80: CPT | Performed by: INTERNAL MEDICINE

## 2025-07-04 PROCEDURE — G8427 DOCREV CUR MEDS BY ELIG CLIN: HCPCS | Performed by: INTERNAL MEDICINE

## 2025-07-04 PROCEDURE — 99223 1ST HOSP IP/OBS HIGH 75: CPT | Performed by: INTERNAL MEDICINE

## 2025-07-05 ENCOUNTER — CLAIMS CREATED FROM THE CLAIM WINDOW (OUTPATIENT)
Dept: URBAN - METROPOLITAN AREA MEDICAL CENTER 27 | Facility: MEDICAL CENTER | Age: 80
End: 2025-07-05
Payer: MEDICARE

## 2025-07-05 DIAGNOSIS — D62 ABLA (ACUTE BLOOD LOSS ANEMIA): ICD-10-CM

## 2025-07-05 DIAGNOSIS — K57.31 DIVERTICULAR HEMORRHAGE: ICD-10-CM

## 2025-07-05 PROCEDURE — 99232 SBSQ HOSP IP/OBS MODERATE 35: CPT | Performed by: INTERNAL MEDICINE

## 2025-07-06 ENCOUNTER — CLAIMS CREATED FROM THE CLAIM WINDOW (OUTPATIENT)
Dept: URBAN - METROPOLITAN AREA MEDICAL CENTER 27 | Facility: MEDICAL CENTER | Age: 80
End: 2025-07-06
Payer: MEDICARE

## 2025-07-06 DIAGNOSIS — K57.31 DIVERTICULAR HEMORRHAGE: ICD-10-CM

## 2025-07-06 DIAGNOSIS — D62 ABLA (ACUTE BLOOD LOSS ANEMIA): ICD-10-CM

## 2025-07-06 PROCEDURE — 99232 SBSQ HOSP IP/OBS MODERATE 35: CPT | Performed by: INTERNAL MEDICINE
